# Patient Record
Sex: MALE | Race: WHITE | NOT HISPANIC OR LATINO | Employment: FULL TIME | URBAN - METROPOLITAN AREA
[De-identification: names, ages, dates, MRNs, and addresses within clinical notes are randomized per-mention and may not be internally consistent; named-entity substitution may affect disease eponyms.]

---

## 2020-10-31 ENCOUNTER — HOSPITAL ENCOUNTER (EMERGENCY)
Facility: HOSPITAL | Age: 50
Discharge: HOME | End: 2020-11-01
Attending: EMERGENCY MEDICINE | Admitting: EMERGENCY MEDICINE

## 2020-10-31 ENCOUNTER — APPOINTMENT (OUTPATIENT)
Dept: GENERAL RADIOLOGY | Facility: HOSPITAL | Age: 50
End: 2020-10-31

## 2020-10-31 VITALS
DIASTOLIC BLOOD PRESSURE: 92 MMHG | HEART RATE: 95 BPM | HEIGHT: 69 IN | OXYGEN SATURATION: 95 % | SYSTOLIC BLOOD PRESSURE: 143 MMHG | TEMPERATURE: 99.1 F | RESPIRATION RATE: 18 BRPM

## 2020-10-31 DIAGNOSIS — S92.332A CLOSED DISPLACED FRACTURE OF THIRD METATARSAL BONE OF LEFT FOOT, INITIAL ENCOUNTER: ICD-10-CM

## 2020-10-31 DIAGNOSIS — S92.325A CLOSED NONDISPLACED FRACTURE OF SECOND METATARSAL BONE OF LEFT FOOT, INITIAL ENCOUNTER: Primary | ICD-10-CM

## 2020-10-31 PROCEDURE — 99283 EMERGENCY DEPT VISIT LOW MDM: CPT

## 2020-10-31 PROCEDURE — 73630 X-RAY EXAM OF FOOT: CPT

## 2020-11-01 PROCEDURE — 25010000002 TDAP 5-2.5-18.5 LF-MCG/0.5 SUSPENSION: Performed by: NURSE PRACTITIONER

## 2020-11-01 PROCEDURE — 96374 THER/PROPH/DIAG INJ IV PUSH: CPT

## 2020-11-01 PROCEDURE — 90471 IMMUNIZATION ADMIN: CPT | Performed by: NURSE PRACTITIONER

## 2020-11-01 PROCEDURE — 90715 TDAP VACCINE 7 YRS/> IM: CPT | Performed by: NURSE PRACTITIONER

## 2020-11-01 PROCEDURE — 25010000002 MORPHINE PER 10 MG: Performed by: NURSE PRACTITIONER

## 2020-11-01 RX ORDER — SODIUM CHLORIDE 0.9 % (FLUSH) 0.9 %
10 SYRINGE (ML) INJECTION AS NEEDED
Status: DISCONTINUED | OUTPATIENT
Start: 2020-11-01 | End: 2020-11-01 | Stop reason: HOSPADM

## 2020-11-01 RX ORDER — HYDROCODONE BITARTRATE AND ACETAMINOPHEN 7.5; 325 MG/1; MG/1
1 TABLET ORAL EVERY 4 HOURS PRN
Qty: 16 TABLET | Refills: 0 | Status: SHIPPED | OUTPATIENT
Start: 2020-11-01 | End: 2020-12-03

## 2020-11-01 RX ORDER — MORPHINE SULFATE 2 MG/ML
4 INJECTION, SOLUTION INTRAMUSCULAR; INTRAVENOUS ONCE
Status: COMPLETED | OUTPATIENT
Start: 2020-11-01 | End: 2020-11-01

## 2020-11-01 RX ORDER — ONDANSETRON 2 MG/ML
4 INJECTION INTRAMUSCULAR; INTRAVENOUS ONCE
Status: DISCONTINUED | OUTPATIENT
Start: 2020-11-01 | End: 2020-11-01 | Stop reason: HOSPADM

## 2020-11-01 RX ADMIN — MORPHINE SULFATE 4 MG: 2 INJECTION, SOLUTION INTRAMUSCULAR; INTRAVENOUS at 00:25

## 2020-11-01 RX ADMIN — TETANUS TOXOID, REDUCED DIPHTHERIA TOXOID AND ACELLULAR PERTUSSIS VACCINE, ADSORBED 0.5 ML: 5; 2.5; 8; 8; 2.5 SUSPENSION INTRAMUSCULAR at 01:22

## 2020-11-01 NOTE — ED PROVIDER NOTES
The OSMAR and I have discussed this patient's history, physical exam, and treatment plan. I have reviewed the documentation and personally had a face to face interaction with the patient  I affirm the documentation and agree with the treatment and plan.  The following describes my personal findings.    The patient presents complaining of left foot pain post boat trailer falling on it    Limited physical exam:  Patient is nontoxic appearing in mild discomfort  Back/extremities: L foot TTpalp over forfoot with abrasion over dorsum, swelling, distal sens/cap refill intact    Pt voices understanding of the absolute need to elevate/ice leg when sitting, follow up with podiatry or orthopedic specialist for further testing/treatment as needed                                                                                                                                                                                                                                            Patient was wearing facemask when I entered the room and throughout our encounter. Full protective equipment was worn throughout this patient encounter including a face mask, eye protection and gloves. Hand hygiene was performed before donning protective equipment and after removal when leaving the room.           Lakesha Ledezma MD  11/01/20 0601

## 2020-11-01 NOTE — ED PROVIDER NOTES
EMERGENCY DEPARTMENT ENCOUNTER    Room Number:  01/01  Date of encounter:  11/1/2020  PCP: Lulu Helton MD  Historian: Patientv    PPE    Patient was placed in face mask in first look. Patient was wearing facemask when I entered the room and throughout our encounter. I wore full protective equipment throughout this patient encounter including a face mask, and gloves. Hand hygiene was performed before donning protective equipment and after removal when leaving the room.        HPI:  Chief Complaint: Left foot injury  A complete HPI/ROS/PMH/PSH/SH/FH are unobtainable due to: Nothing    Context: Didier Solano is a 50 y.o. male who arrives to the ED via private vehicle from home with his wife.  Patient presents with c/o moderate, constant, throbbing left foot pain status post injury prior to arrival.  Patient states that he was hooking up the boat trailer when one of the tires popped.  He states the trailer fell onto his foot.  He states he is not sure how he got his foot out from underneath the trailer but he was able to.   Patient denies any other injuries, fever, chills, chest pain, shortness of breath.  Patient states that nothing makes the symptoms better and any movement worsens symptoms.  Patient is unsure when his last tetanus injection was.  He does have a past medical history of GERD.        PAST MEDICAL HISTORY  Active Ambulatory Problems     Diagnosis Date Noted   • No Active Ambulatory Problems     Resolved Ambulatory Problems     Diagnosis Date Noted   • No Resolved Ambulatory Problems     No Additional Past Medical History         PAST SURGICAL HISTORY  No past surgical history on file.      FAMILY HISTORY  No family history on file.      SOCIAL HISTORY  Social History     Socioeconomic History   • Marital status:      Spouse name: Not on file   • Number of children: Not on file   • Years of education: Not on file   • Highest education level: Not on file          ALLERGIES  Penicillins        REVIEW OF SYSTEMS  Review of Systems     All systems reviewed and negative except for those discussed in HPI.        PHYSICAL EXAM    ED Triage Vitals   Temp Heart Rate Resp BP SpO2   10/31/20 2300 10/31/20 2300 10/31/20 2300 10/31/20 2323 10/31/20 2300   99.1 °F (37.3 °C) 95 18 143/92 99 %       Physical Exam   Musculoskeletal:        Feet:      GENERAL: Well appearing, non-toxic appearing, mildly distressed secondary to pain  HENT: normocephalic, atraumatic  EYES: no scleral icterus, PERRL  CV: regular rhythm, regular rate, no murmur  RESPIRATORY: normal effort, CTAB  ABDOMEN: soft  MUSCULOSKELETAL: no deformity  Tenderness and swelling noted to the dorsal aspect of the left foot, tenderness noted to the second and third metatarsal area  Dopplerable DP and PT pulse on the left  Normal sensation  NEURO: alert, moves all extremities, follows commands, mental status normal/baseline  SKIN: warm, dry, no rash   Psych: Appropriate mood and affect  Nursing notes and vital signs reviewed        RADIOLOGY  Xr Foot 3+ View Left    Result Date: 11/1/2020  3 VIEWS LEFT FOOT  HISTORY: Pain after dropping a bilobed  COMPARISON: None available.  FINDINGS: The patient has fractures involving the midshaft of the second and third metatarsals. The fracture of the second metatarsal is essentially nondisplaced. The fracture of the third metatarsal is mildly displaced laterally. No additional fractures are seen. There is some soft tissue swelling seen about the metatarsals.      Fractures of the midshaft of the second and third metatarsals.  This report was finalized on 11/1/2020 12:08 AM by Dr. Angie aWllace M.D.        I ordered the above noted radiological studies and viewed the images on the PACS system.         MEDICAL RECORD REVIEW  No medical records to review in epic      PROCEDURES    Procedures        DIFFERENTIAL DIAGNOSIS  /Differential Diagnosis for Lower Extremity Injury/trauma  include but are not limited to the following:    Fracture/sprain of the femur, tibia, fibula, ankle, foot or digits      PROGRESS, DATA ANALYSIS, CONSULTS, AND MEDICAL DECISION MAKING        ED Course as of Nov 01 0321   Sun Nov 01, 2020   0015 Discussed pertinent information from history and physical exam with patient.  Discussed differential diagnosis and plan for ED evaluation/work-up and treatment including x-ray of the left foot to rule out fracture and update tetanus injection.  All questions answered.  Patient is agreeable with this plan.        [MS]   0016 I viewed the patient's left foot imaging in PACS.  My interpretation is fractures of the second and third metatarsal.  See dictation for official radiology interpretation.        [MS]   0120 Reviewed pt's history and workup with Dr. Ledezma.  After a bedside evaluation, they agree with the plan of care.          [MS]   0320 Patient updated on x-ray results and need for close follow-up with foot surgeon.  Patient was instructed to wear the boot and use crutches.  He was provided with follow-up numbers for Dr. Cage and orthopedic of his choice.  Patient was given strict return to ER precautions including change in color or temperature of his left foot, decreased sensation, increased pain or swelling.  Patient and wife both verbalized understanding and are agreeable to this plan.    [MS]      ED Course User Index  [MS] Phyllis James APRN     Discussed plan for discharge, as there is no emergent indication for admission. Pt/family is agreeable and understands need for follow up and repeat testing.  Pt is aware that discharge does not mean that nothing is wrong but it indicates no emergency is present that requires admission and they must continue care with follow-up as given below or physician of their choice.   Patient/Family voiced understanding of above instructions.  Patient discharged in stable condition.    DIAGNOSIS  Final diagnoses:   Closed  nondisplaced fracture of second metatarsal bone of left foot, initial encounter   Closed displaced fracture of third metatarsal bone of left foot, initial encounter       FOLLOW UP   Tariq Cage, DPM  [address]  [facility] [address]  [phone]    In 1 day      Lulu Helton MD  [address] [facility] [address]  [address]  [facility] [address]  [phone]    In 1 day        RX     Medication List      New Prescriptions    HYDROcodone-acetaminophen 7.5-325 MG per tablet  Commonly known as: NORCO  Take 1 tablet by mouth Every 4 (Four) Hours As Needed for Moderate Pain .           Where to Get Your Medications      These medications were sent to [id number] - [place], [place] - [address] AT [facility] - [phone] [facility] [phone] FX [address], [facility] [address]    Hours: 24-hours Phone: [phone]   · HYDROcodone-acetaminophen 7.5-325 MG per tablet         Sean report [id number] reviewed.  Risks, benefits, alternatives discussed with patient.  Pt consents to treatment and agrees to follow up with PMD tomorrow for further care and any other prescriptions.           MEDICATIONS GIVEN IN ED    Medications   sodium chloride 0.9 % flush 10 mL (has no administration in time range)   ondansetron (ZOFRAN) injection 4 mg (4 mg Intravenous Not Given 11/1/20 0137)   morphine injection 4 mg (4 mg Intravenous Given 11/1/20 0025)   Tdap (BOOSTRIX) injection 0.5 mL (0.5 mL Intramuscular Given 11/1/20 0122)           COURSE & MEDICAL DECISION MAKING  Any/All labs and Any/All Imaging studies that were ordered were reviewed and are noted above.  Results were reviewed/discussed with the patient and they were also made aware of online assess.   Pt also made aware that some labs, such as cultures, will not be resulted during ER visit and follow up with PMD is necessary.        Phyllis James, APRN  11/01/20 0320

## 2020-11-01 NOTE — ED TRIAGE NOTES
Pt from home for L foot injury. States he had a boat trailer land on it about 30 mins ago. No deformity noted but foot appears bruised  Denies being able to stand on it. Reports pain is radiating to knee and feels nauseous    Pt wearing mask, RN wearing mask and goggles at this time.

## 2020-11-01 NOTE — DISCHARGE INSTRUCTIONS
Medications as ordered  Home to rest  Ice to painful areas for 20 minutes at a time, 4 times a day  Elevated to help reduce swelling  Tylenol or Motrin as needed for mild-moderate pain-take as instructed on package  Follow up with Dr Cage or orthopedic of your choice in 1-2 days, call Monday to schedule an appointment  Return to er for any worsening or new concerns including increased pain or swelling, change in color or temperature of left foot, decreased sensation to left foot

## 2020-11-02 ENCOUNTER — OFFICE VISIT (OUTPATIENT)
Dept: ORTHOPEDIC SURGERY | Facility: CLINIC | Age: 50
End: 2020-11-02

## 2020-11-02 VITALS — BODY MASS INDEX: 25.92 KG/M2 | TEMPERATURE: 98 F | HEIGHT: 69 IN | WEIGHT: 175 LBS

## 2020-11-02 DIAGNOSIS — S92.332A CLOSED DISPLACED FRACTURE OF THIRD METATARSAL BONE OF LEFT FOOT, INITIAL ENCOUNTER: Primary | ICD-10-CM

## 2020-11-02 DIAGNOSIS — S90.819A ABRASION, FOOT W/O INFECTION: ICD-10-CM

## 2020-11-02 DIAGNOSIS — S92.325A CLOSED NONDISPLACED FRACTURE OF SECOND METATARSAL BONE OF LEFT FOOT, INITIAL ENCOUNTER: ICD-10-CM

## 2020-11-02 PROCEDURE — 99204 OFFICE O/P NEW MOD 45 MIN: CPT | Performed by: ORTHOPAEDIC SURGERY

## 2020-11-02 RX ORDER — CLINDAMYCIN HYDROCHLORIDE 150 MG/1
150 CAPSULE ORAL 3 TIMES DAILY
Qty: 30 CAPSULE | Refills: 0 | Status: SHIPPED | OUTPATIENT
Start: 2020-11-02 | End: 2020-11-12

## 2020-11-02 RX ORDER — OMEPRAZOLE 10 MG/1
CAPSULE, DELAYED RELEASE ORAL
COMMUNITY
Start: 2020-06-12

## 2020-11-02 NOTE — PROGRESS NOTES
New Patient Complaint      Patient: Didier Solano  YOB: 1970 50 y.o. male  Medical Record Number: [id number]    Chief Complaints: I hurt my foot    History of Present Illness:   Patient injured his left foot on 10/31/2020 when the boat trailer collapsed landing on the top of his foot he had shoes on and did not puncture his shoe.  He was seen at the emergency room and placed into a boot and crutches and has been nonweightbearing.  He reports moderate intermittent stabbing pain worse with standing improved with ice and rest and with associated bruising and swelling.       HPI    Allergies:   Allergies   Allergen Reactions   • Penicillins Unknown - Low Severity       Medications:   Current Outpatient Medications on File Prior to Visit   Medication Sig   • HYDROcodone-acetaminophen (NORCO) 7.5-325 MG per tablet Take 1 tablet by mouth Every 4 (Four) Hours As Needed for Moderate Pain .   • omeprazole (prilOSEC) 10 MG capsule TAKE 1 TO 2 CAPSULES BY MOUTH DAILY     No current facility-administered medications on file prior to visit.        History reviewed. No pertinent past medical history.  Past Surgical History:   Procedure Laterality Date   • HAND SURGERY       Social History     Occupational History   • Not on file   Tobacco Use   • Smoking status: Never Smoker   • Smokeless tobacco: Never Used   Substance and Sexual Activity   • Alcohol use: Yes   • Drug use: Never   • Sexual activity: Defer      Social History     Social History Narrative   • Not on file     Family History   Problem Relation Age of Onset   • No Known Problems Mother    • No Known Problems Father    • No Known Problems Sister    • No Known Problems Brother    • No Known Problems Maternal Aunt    • No Known Problems Maternal Uncle    • No Known Problems Paternal Aunt    • No Known Problems Paternal Uncle    • No Known Problems Maternal Grandmother    • No Known Problems Maternal Grandfather    • No Known Problems Paternal Grandmother   "  • No Known Problems Paternal Grandfather    • No Known Problems Other    • Anesthesia problems Neg Hx    • Broken bones Neg Hx    • Cancer Neg Hx    • Clotting disorder Neg Hx    • Collagen disease Neg Hx    • Diabetes Neg Hx    • Dislocations Neg Hx    • Osteoporosis Neg Hx    • Rheumatologic disease Neg Hx    • Scoliosis Neg Hx    • Severe sprains Neg Hx        Review of Systems: 14 point review of systems performed, positive pertinent findings identified in HPI. All remaining systems negative     Review of Systems      Physical Exam:   Vitals:    11/02/20 1604   Temp: 98 °F (36.7 °C)   Weight: 79.4 kg (175 lb)   Height: 175.3 cm (69\")   PainSc:   9     Physical Exam   Constitutional: pleasant, well developed He is with his wife  Eyes: sclera non icteric  Hearing : adequate for exam  Cardiovascular: palpable pulses in left foot, left calf/ thigh NT without sign of DVT  Respiratoy: breathing unlabored   Neurological: grossly sensate to LT throughout left LE  Psychiatric: oriented with normal mood and affect.   Lymphatic: No palpable popliteal lymphadenopathy left LE  Skin: intact throughout left leg/foot except for abrasion over the dorsum of the left foot with no surrounding induration or drainage.  Musculoskeletal: Left foot shows moderate swelling but no sign of compartment syndrome.  He had somewhat limited motion to the toes but was able to flex and extend them to some degree and moderate discomfort of the dorsum of the left forefoot.  Physical Exam left  Ortho Exam    Radiology: 3 views of the left foot reviewed on the [facility] system from 10/31/2020 which show minimally displaced fracture of the third metatarsal and essentially nondisplaced fracture of the second toe appreciate significant angulation or translation of either fracture of the may be some plantar displacement of the third.    Assessment/Plan: Left second and third metatarsal fracture.    Reviewed with him that given the current alignment I " would not necessarily recommend surgical treatment but we need to get a CT scan to get a better idea of the three-dimensional alignment to make that definitive call.    Reviewed local wound care with him and we will go ahead and place him on clindamycin 150 mg p.o. every 8 hours for the next 10 days although I do not feel this represents an open fracture there is certainly a wound over the area.    We will continue with elevation and nonweightbearing.    Counseled him against use of oral anti-inflammatory such as Aleve Advil Naprosyn Motrin etc. but may use Tylenol so as to minimize chance of bone healing inhibition.    We will get a CT scan later this week or early next week and see him back in about 10 days with x-rays of his left foot and determine definitive treatment course going forward.

## 2020-11-03 NOTE — PROGRESS NOTES
I GOT HIM SCHEDULED FOR Monday 11/9TH AT [facility].  LEFT MESSAGE ON HIS VOICE MAIL WITH INFORMATION REGARDING CT SCAN.

## 2020-11-09 ENCOUNTER — HOSPITAL ENCOUNTER (OUTPATIENT)
Dept: CT IMAGING | Facility: HOSPITAL | Age: 50
Discharge: HOME | End: 2020-11-09
Admitting: ORTHOPAEDIC SURGERY

## 2020-11-09 DIAGNOSIS — S92.332A CLOSED DISPLACED FRACTURE OF THIRD METATARSAL BONE OF LEFT FOOT, INITIAL ENCOUNTER: ICD-10-CM

## 2020-11-09 DIAGNOSIS — S92.325A CLOSED NONDISPLACED FRACTURE OF SECOND METATARSAL BONE OF LEFT FOOT, INITIAL ENCOUNTER: ICD-10-CM

## 2020-11-09 PROCEDURE — 73700 CT LOWER EXTREMITY W/O DYE: CPT

## 2020-11-09 PROCEDURE — 76377 3D RENDER W/INTRP POSTPROCES: CPT

## 2020-11-11 ENCOUNTER — OFFICE VISIT (OUTPATIENT)
Dept: ORTHOPEDIC SURGERY | Facility: CLINIC | Age: 50
End: 2020-11-11

## 2020-11-11 VITALS — HEIGHT: 69 IN | WEIGHT: 175 LBS | BODY MASS INDEX: 25.92 KG/M2

## 2020-11-11 DIAGNOSIS — S92.332D CLOSED DISPLACED FRACTURE OF THIRD METATARSAL BONE OF LEFT FOOT WITH ROUTINE HEALING, SUBSEQUENT ENCOUNTER: ICD-10-CM

## 2020-11-11 DIAGNOSIS — S92.315A CLOSED NONDISPLACED FRACTURE OF FIRST METATARSAL BONE OF LEFT FOOT, INITIAL ENCOUNTER: Primary | ICD-10-CM

## 2020-11-11 DIAGNOSIS — S92.325D CLOSED NONDISPLACED FRACTURE OF SECOND METATARSAL BONE OF LEFT FOOT WITH ROUTINE HEALING, SUBSEQUENT ENCOUNTER: ICD-10-CM

## 2020-11-11 DIAGNOSIS — S90.819A ABRASION, FOOT W/O INFECTION: ICD-10-CM

## 2020-11-11 PROCEDURE — 99213 OFFICE O/P EST LOW 20 MIN: CPT | Performed by: ORTHOPAEDIC SURGERY

## 2020-11-11 NOTE — PROGRESS NOTES
"Foot Follow Up      Patient: Didier Solano    YOB: 1970 50 y.o. male    Chief Complaints: Foot getting better    History of Present Illness: Patient was seen initially on 11/2/2020 after injuring his left foot on 10/31/2020 when a boat trailer landed on it.  He was found to have at that time second and third metatarsal fractures and abrasion to the top of his foot.    He has been nonweightbearing and elevating and was sent for CT scan    States his pain and mobility have improved still with some swelling to the foot especially when hangs out when he is typing has been nonweightbearing using a scooter.  His abrasion is healing he has had no drainage or sign of infection to that area and does have some diminished sensation in the top of the foot.  Only gets a slight aching pain  HPI    ROS: Foot pain, no fevers chills chest pain or shortness of breath  Past Medical History:   Diagnosis Date   • Closed displaced fracture of third metatarsal bone of left foot 11/2/2020     Physical Exam:   Vitals:    11/11/20 0833   Weight: 79.4 kg (175 lb)   Height: 175.3 cm (69\")   PainSc: 0-No pain     Well developed with normal mood.  Left foot shows mild to moderate swelling.  The abrasion dorsum of the foot measures about 5 mm x 5 mm with area of eschar but no surrounding erythema does not appear to be full-thickness.  He was able to flex and extend his toes to some degree with more discomfort coming up and down.  There was some diminished sensation in the dorsum of the midfoot but grossly intact light touch.    Mild discomfort in the dorsum of the central forefoot and proximal medial midfoot but no gross deformity.  He was nontender on the first MTP joint    Radiology: CT scan films and report of the left foot dated 11/9/2020 were reviewed which shows a comminuted but nondisplaced intra-articular fracture on the dorsal lateral aspect of the first metatarsal base with the remainder the first metatarsal remaining " intact.    Transverse nondisplaced second metatarsal midshaft fracture and also transverse fracture of the distal third metatarsal shaft with 2 mm of plantar lateral displacement without appreciable angular deformity.    There was no abnormal widening of the space between the second metatarsal base and the first cuneiform    There is also chronic ossification of the lateral flexor tendon insertion to the base of the great toe proximal phalanx sesamoids are intact but lateral sesamoid is slightly proximally positioned felt to be result of an old collateral plantar plate injury.    In retrospect these were evident on his previous x-rays      Assessment/Plan: 1.  Left proximal 1st metatarsal dorsal lateral comminuted nondisplaced intra-articular fracture  2.  Left second metatarsal shaft nondisplaced fracture  3.  Left third metatarsal distal shaft fracture with 2 mm of plantar lateral displacement without significant angular deformity  4.  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    I do not see any sign of infection to the abrasion and he will continue with wound care to this area.    We will continue with elevation and nonweightbearing and use some compression socks that he has for daytime use to help with swelling and instructed him on passive range of motion exercises for the toes.    Anything worsens let me know otherwise I will see him back in 3 weeks x-rays of his left foot.    He was  provided with a temporary handicap parking application today.

## 2020-11-16 ENCOUNTER — HOSPITAL ENCOUNTER (OUTPATIENT)
Dept: CARDIOLOGY | Facility: HOSPITAL | Age: 50
Discharge: HOME | End: 2020-11-16
Admitting: ORTHOPAEDIC SURGERY

## 2020-11-16 ENCOUNTER — TELEPHONE (OUTPATIENT)
Dept: ORTHOPEDIC SURGERY | Facility: CLINIC | Age: 50
End: 2020-11-16

## 2020-11-16 DIAGNOSIS — M79.662 PAIN OF LEFT CALF: ICD-10-CM

## 2020-11-16 DIAGNOSIS — M79.662 PAIN OF LEFT CALF: Primary | ICD-10-CM

## 2020-11-16 LAB
BH CV LOW VAS LEFT SOLEAL VESSEL: 1
BH CV LOWER VASCULAR LEFT COMMON FEMORAL AUGMENT: NORMAL
BH CV LOWER VASCULAR LEFT COMMON FEMORAL COMPETENT: NORMAL
BH CV LOWER VASCULAR LEFT COMMON FEMORAL COMPRESS: NORMAL
BH CV LOWER VASCULAR LEFT COMMON FEMORAL PHASIC: NORMAL
BH CV LOWER VASCULAR LEFT COMMON FEMORAL SPONT: NORMAL
BH CV LOWER VASCULAR LEFT DISTAL FEMORAL COMPRESS: NORMAL
BH CV LOWER VASCULAR LEFT GASTRONEMIUS COMPRESS: NORMAL
BH CV LOWER VASCULAR LEFT GREATER SAPH AK COMPRESS: NORMAL
BH CV LOWER VASCULAR LEFT GREATER SAPH BK COMPRESS: NORMAL
BH CV LOWER VASCULAR LEFT LESSER SAPH COMPRESS: NORMAL
BH CV LOWER VASCULAR LEFT MID FEMORAL AUGMENT: NORMAL
BH CV LOWER VASCULAR LEFT MID FEMORAL COMPETENT: NORMAL
BH CV LOWER VASCULAR LEFT MID FEMORAL COMPRESS: NORMAL
BH CV LOWER VASCULAR LEFT MID FEMORAL PHASIC: NORMAL
BH CV LOWER VASCULAR LEFT MID FEMORAL SPONT: NORMAL
BH CV LOWER VASCULAR LEFT PERONEAL COMPRESS: NORMAL
BH CV LOWER VASCULAR LEFT POPLITEAL AUGMENT: NORMAL
BH CV LOWER VASCULAR LEFT POPLITEAL COMPETENT: NORMAL
BH CV LOWER VASCULAR LEFT POPLITEAL COMPRESS: NORMAL
BH CV LOWER VASCULAR LEFT POPLITEAL PHASIC: NORMAL
BH CV LOWER VASCULAR LEFT POPLITEAL SPONT: NORMAL
BH CV LOWER VASCULAR LEFT POSTERIOR TIBIAL COMPRESS: NORMAL
BH CV LOWER VASCULAR LEFT PROFUNDA FEMORAL COMPRESS: NORMAL
BH CV LOWER VASCULAR LEFT PROXIMAL FEMORAL COMPRESS: NORMAL
BH CV LOWER VASCULAR LEFT SAPHENOFEMORAL JUNCTION COMPRESS: NORMAL
BH CV LOWER VASCULAR LEFT SOLEAL COMPRESS: NORMAL
BH CV LOWER VASCULAR LEFT SOLEAL THROMBUS: NORMAL
BH CV LOWER VASCULAR RIGHT COMMON FEMORAL AUGMENT: NORMAL
BH CV LOWER VASCULAR RIGHT COMMON FEMORAL COMPETENT: NORMAL
BH CV LOWER VASCULAR RIGHT COMMON FEMORAL COMPRESS: NORMAL
BH CV LOWER VASCULAR RIGHT COMMON FEMORAL PHASIC: NORMAL
BH CV LOWER VASCULAR RIGHT COMMON FEMORAL SPONT: NORMAL

## 2020-11-16 PROCEDURE — 93971 EXTREMITY STUDY: CPT

## 2020-11-16 NOTE — PROGRESS NOTES
LLE Venous doppler study complete. Preliminary positive for acute calf DVT called to Dr. Armstrong. He will call patient   
Statement Selected

## 2020-11-16 NOTE — TELEPHONE ENCOUNTER
"Patient was last seen on 11/9/2020 states around 11/12/2020 start of increased pain swelling and tightness in his left calf and some of his leg into his hamstring.  He has been trying to do some stretching and using a \"rolling pin\" on it no chest pain or shortness of breath.    Reviewed him obviously there is concern for DVT.  We will work on arranging a Doppler for today and he understands he will need to be anticoagulated if there is such.  "

## 2020-11-16 NOTE — TELEPHONE ENCOUNTER
Patient is calling stating he has severe pain in his Lt calf and hamstring. He is having trouble sleeping and ice is not helping. Please advise.cld

## 2020-11-17 NOTE — TELEPHONE ENCOUNTER
I spoke with patient on the phone about his Doppler results and that he has a DVT in the soleal vessel explained in detail with him.  Reviewed the nature of this and that we need to treat him with anticoagulation and called in a prescription to his pharmacy for Eliquis 5 mg.  Instruction to take 2 tablets every 12 hours for the next 7 days and then 1 tablet every 12 hours and was given a 3-week supply and will need a total of 6 weeks of anticoagulation    Explained that at 6 weeks we will recheck a Doppler and if it is subacute or acute will need an additional 6 weeks of anticoagulation if it is chronic may then discontinued.    Counseled him about side effects of this which can include but not limited to urinary and GI bleeding counseled to look for any black or tarry stools and to be very careful as far as any cutting of himself with shaving etc. also if he develops any chest pain or shortness of breath go immediately to emergency room.    Also recommended that he come in to see Tono about getting some compression hose and to continue with limited weightbearing until follow-up.  He voiced clear understanding of all this and was encouraged to call for any questions.

## 2020-11-17 NOTE — TELEPHONE ENCOUNTER
He just saw Dr. Armstrong yesterday, but it sounds to me like there could be a concern for a blood clot.  Send him for Doppler exam

## 2020-12-03 ENCOUNTER — OFFICE VISIT (OUTPATIENT)
Dept: ORTHOPEDIC SURGERY | Facility: CLINIC | Age: 50
End: 2020-12-03

## 2020-12-03 VITALS — TEMPERATURE: 96 F | HEIGHT: 69 IN | WEIGHT: 175 LBS | BODY MASS INDEX: 25.92 KG/M2

## 2020-12-03 DIAGNOSIS — S92.332D CLOSED DISPLACED FRACTURE OF THIRD METATARSAL BONE OF LEFT FOOT WITH ROUTINE HEALING, SUBSEQUENT ENCOUNTER: ICD-10-CM

## 2020-12-03 DIAGNOSIS — S92.325D CLOSED NONDISPLACED FRACTURE OF SECOND METATARSAL BONE OF LEFT FOOT WITH ROUTINE HEALING, SUBSEQUENT ENCOUNTER: ICD-10-CM

## 2020-12-03 DIAGNOSIS — S90.819A ABRASION, FOOT W/O INFECTION: Primary | ICD-10-CM

## 2020-12-03 DIAGNOSIS — I82.4Z2 ACUTE DEEP VEIN THROMBOSIS (DVT) OF DISTAL VEIN OF LEFT LOWER EXTREMITY (HCC): ICD-10-CM

## 2020-12-03 DIAGNOSIS — S92.315D CLOSED NONDISPLACED FRACTURE OF FIRST METATARSAL BONE OF LEFT FOOT WITH ROUTINE HEALING, SUBSEQUENT ENCOUNTER: ICD-10-CM

## 2020-12-03 PROCEDURE — 99213 OFFICE O/P EST LOW 20 MIN: CPT | Performed by: ORTHOPAEDIC SURGERY

## 2020-12-03 PROCEDURE — 73630 X-RAY EXAM OF FOOT: CPT | Performed by: ORTHOPAEDIC SURGERY

## 2020-12-03 RX ORDER — APIXABAN 5 MG/1
TABLET, FILM COATED ORAL
COMMUNITY
Start: 2020-11-16 | End: 2021-01-07 | Stop reason: SDUPTHER

## 2020-12-03 NOTE — PROGRESS NOTES
"Foot Follow Up      Patient: Didier Solano    YOB: 1970 50 y.o. male    Chief Complaints: Foot feeling better    History of Present Illness:Patient was seen initially on 11/2/2020 after injuring his left foot on 10/31/2020 when a boat trailer landed on it.  He was found to have at that time second and third metatarsal fractures and abrasion to the top of his foot.    He was sent for CT scan and was last seen on 11/11/2020.  He was found to have additionally a fracture at the proximal aspect of the first metatarsal was intra-articular but nondisplaced as well as some chronic ossification around the left great toe lateral flexor tendon insertion at the base of the proximal phalanx likely from old plantar plate injury.    No surgical recommendations were made and he was instructed on elevation and use of compression socks that he already had and passive range of motion exercises.    He called on 11/16/2020 with complaints of tightness in his calf and was sent for Doppler.  Doppler from 1620 showed an acute DVT in the soleal vessel and he was started on Eliquis and was instructed on getting formal compression hose.    However he decided just use his compression socks as these have helped with his swelling and he has not had any calf pain since he started on the Eliquis and swelling has essentially resolved.    He reports that the pain in his foot has markedly improved with only a slight ache mainly along the third metatarsal.      HPI    ROS: Foot pain no fevers chills chest pain or shortness of breath  Past Medical History:   Diagnosis Date   • Closed displaced fracture of third metatarsal bone of left foot 11/2/2020     Physical Exam:   Vitals:    12/03/20 0845   Temp: 96 °F (35.6 °C)   Weight: 79.4 kg (175 lb)   Height: 175.3 cm (69\")   PainSc: 0-No pain     Well developed with normal mood.  Left foot abrasion is essentially healed with no sign of infection.  There is very slight discomfort on the third " metatarsal much more so than the second but no clinical deformity he was able to flex and extend his toes relatively well and left calf with it was without focal swelling or pain.      Radiology: 3 views of the left foot ordered evaluate fracture alignment reviewed and compared to prior x-rays on the [facility] and his previous CT scan.  Compared with prior x-rays is been some slight lateral shift of the distal portion of the third metatarsal but I do not think it is changed much from the CT scan and the second is without change in alignment there does not appear to be any appreciable sagittal plane deformity and does appear to be probably some early callus formation.  The base of the first metatarsal is without change in alignment and articular surface appears congruent.      Assessment/Plan:  1.  Left proximal 1st metatarsal dorsal lateral comminuted nondisplaced intra-articular fracture  2.  Left second metatarsal shaft nondisplaced fracture  3.  Left third metatarsal distal shaft fracture with 2 mm of plantar lateral displacement without significant angular deformity  4.  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  Left calf soleal DVT    We discussed treatment options and x-ray findings and nothing I would recommend from a surgical standpoint at this time.    We will continue with compression socks and Eliquis and will allow him to do about 50% partial foot flat weightbearing with his boot and crutches but understands not to rock up on his toes and to continue avoiding any anti-inflammatories.    If it is anything worsening to let me know otherwise I will see him back in about 3 weeks with x-rays of his left foot.

## 2020-12-23 ENCOUNTER — LAB (OUTPATIENT)
Dept: LAB | Facility: HOSPITAL | Age: 50
End: 2020-12-23

## 2020-12-23 ENCOUNTER — OFFICE VISIT (OUTPATIENT)
Dept: ORTHOPEDIC SURGERY | Facility: CLINIC | Age: 50
End: 2020-12-23

## 2020-12-23 VITALS — TEMPERATURE: 95.3 F | HEIGHT: 69 IN | BODY MASS INDEX: 26.66 KG/M2 | WEIGHT: 180 LBS

## 2020-12-23 DIAGNOSIS — E55.9 VITAMIN D DEFICIENCY: ICD-10-CM

## 2020-12-23 DIAGNOSIS — S92.315D CLOSED NONDISPLACED FRACTURE OF FIRST METATARSAL BONE OF LEFT FOOT WITH ROUTINE HEALING, SUBSEQUENT ENCOUNTER: ICD-10-CM

## 2020-12-23 DIAGNOSIS — S92.325D CLOSED NONDISPLACED FRACTURE OF SECOND METATARSAL BONE OF LEFT FOOT WITH ROUTINE HEALING, SUBSEQUENT ENCOUNTER: ICD-10-CM

## 2020-12-23 DIAGNOSIS — M79.672 LEFT FOOT PAIN: ICD-10-CM

## 2020-12-23 DIAGNOSIS — S90.819A ABRASION, FOOT W/O INFECTION: ICD-10-CM

## 2020-12-23 DIAGNOSIS — I82.4Z2 ACUTE DEEP VEIN THROMBOSIS (DVT) OF DISTAL VEIN OF LEFT LOWER EXTREMITY (HCC): Primary | ICD-10-CM

## 2020-12-23 LAB — 25(OH)D3 SERPL-MCNC: 30.3 NG/ML (ref 30–100)

## 2020-12-23 PROCEDURE — 73630 X-RAY EXAM OF FOOT: CPT | Performed by: ORTHOPAEDIC SURGERY

## 2020-12-23 PROCEDURE — 99213 OFFICE O/P EST LOW 20 MIN: CPT | Performed by: ORTHOPAEDIC SURGERY

## 2020-12-23 PROCEDURE — 36415 COLL VENOUS BLD VENIPUNCTURE: CPT

## 2020-12-23 PROCEDURE — 82306 VITAMIN D 25 HYDROXY: CPT

## 2020-12-23 NOTE — PROGRESS NOTES
"Foot Follow Up      Patient: Didier Solano    YOB: 1970 50 y.o. male    Chief Complaints: Foot \"pretty good\"    History of Present Illness: Patient follows up nonoperative treatment of left second and third metatarsal fractures that occurred on 10/31/2020 when a boat trailer landed on initially an abrasion on the top of his foot which healed.    During his treatment h had increased swelling and tightness in his calf and had a Doppler on 11/16/2020 which had shown soleal vessel DVTs and has been on Eliquis.     He was last seen on 12/3/2020 and has been using compression socks and has had no further complaints of pain or swelling in the calf.  He has been in his boot and using a scooter not putting much of any weight on his foot although he had been instructed on up to 50% partial weightbearing foot flat with his boot and crutches.    He has really minimal if any complaints of pain in the left foot      HPI    ROS: Foot pain no chest pain or shortness of breath  Past Medical History:   Diagnosis Date   • Closed displaced fracture of third metatarsal bone of left foot 11/2/2020     Physical Exam:   Vitals:    12/23/20 0915   Temp: 95.3 °F (35.2 °C)   TempSrc: Temporal   Weight: 81.6 kg (180 lb)   Height: 175.3 cm (69\")   PainSc: 0-No pain     Well developed with normal mood.  Left foot shows slight swelling unable to palpate any focal tenderness over the dorsum of the midfoot or forefoot today is able to flex extend his toes relatively well.  Left calf was soft and nontender.      Radiology: 3 views of the left foot ordered evaluate fracture alignment reviewed and compared to previous x-rays.  There is been no change in alignment to the there is been no change in alignment to the second and third metatarsal fractures with only slight angulation of the second.  There does appear to be some increased callus formation compared with previous x-rays and no appreciable change in sagittal alignment.  The " fracture at the base of the first metatarsal appears to be healing without displacement or appreciable step-off at the articular surface.    Vitamin D 12/23/2020 30.3      Assessment/Plan:  1.  Left proximal 1st metatarsal dorsal lateral comminuted nondisplaced intra-articular fracture  2.  Left second metatarsal shaft nondisplaced fracture  3.  Left third metatarsal distal shaft fracture with 2 mm of plantar lateral displacement without significant angular deformity  4.  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  Left calf soleal DVT    We reviewed treatment options C thing I would do from a surgical standpoint at this time although would like to see more healing thankfully no seen displacement.    He was sent over and had a vitamin D level drawn today which was only 30.3 and he was instructed to begin 5000 units of vitamin D daily and will check this again in about 4 weeks and will need to order this after I see him back again.    Regarding the fractures I reviewed treatment with him and would like anything from a surgical standpoint at this time I do not think we need to get a CT at this point.  We will allow him partial weightbearing with the boot and cane that was fitted today and he will continue with compression hose for the DVT and on Eliquis.    He needs to get a Doppler rechecked again in about a week understanding that if it is still acute or subacute he will need another 6 weeks of anticoagulation.    If he has any exacerbation of pain in his left foot he will back off and let me know otherwise I will see him back in 3 weeks with x-rays of his left foot.

## 2021-01-07 ENCOUNTER — TELEPHONE (OUTPATIENT)
Dept: ORTHOPEDIC SURGERY | Facility: CLINIC | Age: 51
End: 2021-01-07

## 2021-01-07 ENCOUNTER — HOSPITAL ENCOUNTER (OUTPATIENT)
Dept: CARDIOLOGY | Facility: HOSPITAL | Age: 51
Discharge: HOME | End: 2021-01-07
Admitting: ORTHOPAEDIC SURGERY

## 2021-01-07 ENCOUNTER — APPOINTMENT (OUTPATIENT)
Dept: CARDIOLOGY | Facility: HOSPITAL | Age: 51
End: 2021-01-07

## 2021-01-07 DIAGNOSIS — I82.4Z2 ACUTE DEEP VEIN THROMBOSIS (DVT) OF DISTAL VEIN OF LEFT LOWER EXTREMITY (HCC): ICD-10-CM

## 2021-01-07 PROCEDURE — 93971 EXTREMITY STUDY: CPT

## 2021-01-07 RX ORDER — APIXABAN 5 MG/1
5 TABLET, FILM COATED ORAL EVERY 12 HOURS SCHEDULED
Qty: 60 TABLET | Refills: 1 | Status: SHIPPED | OUTPATIENT
Start: 2021-01-07

## 2021-01-07 NOTE — TELEPHONE ENCOUNTER
----- Message from Jamil Armstrong MD sent at 1/7/2021 12:56 PM EST -----  Can you please call him about this and let them know that it appears to be subacute so we need to continue with Eliquis for another 6 weeks and make sure he has a prescription for this.  Please also make sure he has a follow-up appointment to see me in the next week or two.  Thank you

## 2021-01-07 NOTE — TELEPHONE ENCOUNTER
Have spoken with the patient regarding his Doppler report.  Of the repeat Doppler does show subacute thrombosis left calf.  Nathaniel is recommending that he continue with the Eliquis for an additional 6 weeks.  New prescription has been sent into [facility] at [phone] for Eliquis 5 mg, #60, directions are to take 1 every 12 hours and 1 refill per Dr. Armstrong.  Have also made him an appointment to see Dr. Armstrong in the office for follow-up on January 25

## 2021-01-25 ENCOUNTER — LAB (OUTPATIENT)
Dept: LAB | Facility: HOSPITAL | Age: 51
End: 2021-01-25

## 2021-01-25 ENCOUNTER — OFFICE VISIT (OUTPATIENT)
Dept: ORTHOPEDIC SURGERY | Facility: CLINIC | Age: 51
End: 2021-01-25

## 2021-01-25 VITALS — WEIGHT: 180 LBS | TEMPERATURE: 98 F | HEIGHT: 69 IN | BODY MASS INDEX: 26.66 KG/M2

## 2021-01-25 DIAGNOSIS — I82.462 DEEP VEIN THROMBOSIS (DVT) OF CALF MUSCLE VEIN OF LEFT LOWER EXTREMITY, UNSPECIFIED CHRONICITY (HCC): Primary | ICD-10-CM

## 2021-01-25 DIAGNOSIS — S92.332D CLOSED DISPLACED FRACTURE OF THIRD METATARSAL BONE OF LEFT FOOT WITH ROUTINE HEALING, SUBSEQUENT ENCOUNTER: ICD-10-CM

## 2021-01-25 DIAGNOSIS — R52 PAIN: ICD-10-CM

## 2021-01-25 DIAGNOSIS — S90.819A ABRASION, FOOT W/O INFECTION: ICD-10-CM

## 2021-01-25 DIAGNOSIS — S92.325D CLOSED NONDISPLACED FRACTURE OF SECOND METATARSAL BONE OF LEFT FOOT WITH ROUTINE HEALING, SUBSEQUENT ENCOUNTER: ICD-10-CM

## 2021-01-25 DIAGNOSIS — E55.9 VITAMIN D DEFICIENCY: ICD-10-CM

## 2021-01-25 DIAGNOSIS — S92.315D CLOSED NONDISPLACED FRACTURE OF FIRST METATARSAL BONE OF LEFT FOOT WITH ROUTINE HEALING, SUBSEQUENT ENCOUNTER: ICD-10-CM

## 2021-01-25 LAB — 25(OH)D3 SERPL-MCNC: 39.8 NG/ML (ref 30–100)

## 2021-01-25 PROCEDURE — 73630 X-RAY EXAM OF FOOT: CPT | Performed by: ORTHOPAEDIC SURGERY

## 2021-01-25 PROCEDURE — 82306 VITAMIN D 25 HYDROXY: CPT

## 2021-01-25 PROCEDURE — 99214 OFFICE O/P EST MOD 30 MIN: CPT | Performed by: ORTHOPAEDIC SURGERY

## 2021-01-25 PROCEDURE — 36415 COLL VENOUS BLD VENIPUNCTURE: CPT

## 2021-01-25 RX ORDER — ACYCLOVIR 400 MG/1
400 TABLET ORAL 3 TIMES DAILY
COMMUNITY
Start: 2021-01-16

## 2021-01-25 NOTE — PROGRESS NOTES
"Foot Follow Up      Patient: Didier Solano    YOB: 1970 50 y.o. male    Chief Complaints: Foot \"feels fine\"    History of Present Illness:Patient follows up nonoperative treatment of left second and third metatarsal fractures that occurred on 10/31/2020 when a boat trailer landed on initially an abrasion on the top of his foot which healed.     During his treatment he had increased swelling and tightness in his calf and had a Doppler on 11/16/2020 which had shown soleal vessel DVTs and has been on Eliquis.    He was last seen on 12/23/2020 have been using compression hose and no complaints of pain swelling in the calf and been doing by 50% weightbearing with his foot with minimal if any complaints of pain.    He was instructed on continued use of his boot and offloading it with a cane and continue compression hose as well as to continue Eliquis.  He was also sent for vitamin D level.    He seen back states that his foot is feeling fine he is mainly just been using his boot not really been using his cane much and occasionally gone without his boot in the house but being very careful about putting weight on the forefoot without significant complaints of pain in the foot       HPI    ROS: Foot pain no chest pain or shortness of breath  Past Medical History:   Diagnosis Date   • Closed displaced fracture of third metatarsal bone of left foot 11/2/2020     Physical Exam:   Vitals:    01/25/21 1613   Temp: 98 °F (36.7 °C)   Weight: 81.6 kg (180 lb)   Height: 175.3 cm (69\")   PainSc: 0-No pain     Well developed with normal mood.  Left foot showed really no focal discomfort over the second or third metatarsals to palpation was able to flex and extend his toes relatively well.  He was nontender in the first TMT joint.  Left calf was without focal pain or swelling.      Radiology: 3 views of the left foot ordered evaluate fracture alignment reviewed and compared to previous x-rays.  There is been no change in " alignment of the second and third metatarsal fractures which do appear to have increased callus formation around them of the fracture line is still somewhat evident.   the fracture line at the base of the first metatarsal is not clearly evident.  There remains ossification is chronic over the plantar lateral aspect of the first MTP joint.    Doppler report dated 1/7/2020 one of the left leg shows subacute DVT in the soleal vessels.    Vitamin D 12/23/2020 30.3    Assessment/Plan:  1.  Left proximal 1st metatarsal dorsal lateral comminuted nondisplaced intra-articular fracture  2.  Left second metatarsal shaft nondisplaced fracture  3.  Left third metatarsal distal shaft fracture with 2 mm of plantar lateral displacement without significant angular deformity  4.  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  Left calf soleal subacute DVT    We discussed treatment options although we can still see the fractures some on x-ray they do seem to be improving clinically and radiographically so not recommend further work-up as far CT scan or surgical treatment at this time.  In about 3 months I think we can start weaning him out of his boot and he is been doing some already to use a stiff sturdy accommodative shoe.  We will have him do this around the house for the next 10 to 14 days and if he does well may then start weaning out of it out of the house and use his cane if needed.    If he has any exacerbation of pain to back off and let me know.    Regarding his vitamin D he has been on 5000 units daily and needs to get his level rechecked and will do as such today..    Regarding his DVT he continues on Eliquis and will need to recheck his Doppler again in about 3 weeks as well.    We will see him back in 3 weeks with x-rays of his left foot.

## 2021-01-26 PROBLEM — E55.9 VITAMIN D DEFICIENCY: Status: ACTIVE | Noted: 2021-01-26

## 2021-01-26 PROBLEM — I82.462 DEEP VEIN THROMBOSIS (DVT) OF CALF MUSCLE VEIN OF LEFT LOWER EXTREMITY (HCC): Status: ACTIVE | Noted: 2020-12-03

## 2021-01-26 PROBLEM — S92.315A CLOSED NONDISPLACED FRACTURE OF FIRST LEFT METATARSAL BONE: Status: ACTIVE | Noted: 2021-01-26

## 2021-02-10 ENCOUNTER — HOSPITAL ENCOUNTER (OUTPATIENT)
Dept: CARDIOLOGY | Facility: HOSPITAL | Age: 51
Discharge: HOME | End: 2021-02-10
Admitting: ORTHOPAEDIC SURGERY

## 2021-02-10 DIAGNOSIS — I82.462 DEEP VEIN THROMBOSIS (DVT) OF CALF MUSCLE VEIN OF LEFT LOWER EXTREMITY, UNSPECIFIED CHRONICITY (HCC): ICD-10-CM

## 2021-02-10 PROCEDURE — 93971 EXTREMITY STUDY: CPT

## 2021-02-11 ENCOUNTER — TELEPHONE (OUTPATIENT)
Dept: ORTHOPEDIC SURGERY | Facility: CLINIC | Age: 51
End: 2021-02-11

## 2021-02-11 DIAGNOSIS — I82.462 DEEP VEIN THROMBOSIS (DVT) OF CALF MUSCLE VEIN OF LEFT LOWER EXTREMITY, UNSPECIFIED CHRONICITY (HCC): Primary | ICD-10-CM

## 2021-02-11 NOTE — TELEPHONE ENCOUNTER
----- Message from Jamil Armstrong MD sent at 2/11/2021  7:48 AM EST -----  Can you please call about results. DVT remains sub-acute. Needs to cont with Eliquis and needs appt with vascular for continued treatment of DVT thanks

## 2021-02-11 NOTE — TELEPHONE ENCOUNTER
Repeat Doppler at 12 weeks still shows the clot to be subacute.  Patient remains on Eliquis.  Dr. Armstrong is recommending that he see vascular surgery for further evaluation and determine how much longer the patient needs to remain on medication.  I have made him an appointment to see Dr. Fred Perez on Monday, February 15 at 10 AM.  I have left a message for the patient to contact me at the office regarding this appointment.  Information has been faxed to [phone]

## 2021-02-17 ENCOUNTER — OFFICE VISIT (OUTPATIENT)
Dept: ORTHOPEDIC SURGERY | Facility: CLINIC | Age: 51
End: 2021-02-17

## 2021-02-17 VITALS — TEMPERATURE: 98 F | BODY MASS INDEX: 26.66 KG/M2 | WEIGHT: 180 LBS | HEIGHT: 69 IN

## 2021-02-17 DIAGNOSIS — S92.325D CLOSED NONDISPLACED FRACTURE OF SECOND METATARSAL BONE OF LEFT FOOT WITH ROUTINE HEALING, SUBSEQUENT ENCOUNTER: ICD-10-CM

## 2021-02-17 DIAGNOSIS — S92.332D CLOSED DISPLACED FRACTURE OF THIRD METATARSAL BONE OF LEFT FOOT WITH ROUTINE HEALING, SUBSEQUENT ENCOUNTER: ICD-10-CM

## 2021-02-17 DIAGNOSIS — S92.315D CLOSED NONDISPLACED FRACTURE OF FIRST METATARSAL BONE OF LEFT FOOT WITH ROUTINE HEALING, SUBSEQUENT ENCOUNTER: ICD-10-CM

## 2021-02-17 DIAGNOSIS — R52 PAIN: Primary | ICD-10-CM

## 2021-02-17 DIAGNOSIS — I82.462 DEEP VEIN THROMBOSIS (DVT) OF CALF MUSCLE VEIN OF LEFT LOWER EXTREMITY, UNSPECIFIED CHRONICITY (HCC): ICD-10-CM

## 2021-02-17 DIAGNOSIS — E55.9 VITAMIN D DEFICIENCY: ICD-10-CM

## 2021-02-17 PROCEDURE — 73630 X-RAY EXAM OF FOOT: CPT | Performed by: ORTHOPAEDIC SURGERY

## 2021-02-17 PROCEDURE — 99214 OFFICE O/P EST MOD 30 MIN: CPT | Performed by: ORTHOPAEDIC SURGERY

## 2021-02-17 NOTE — PROGRESS NOTES
"Foot Follow Up      Patient: Didier Solano    YOB: 1970 50 y.o. male    Chief Complaints: Foot feeling better    History of Present Illness:Patient follows up nonoperative treatment of left second and third metatarsal fractures that occurred on 10/31/2020 when a boat trailer landed on initially an abrasion on the top of his foot which healed.     During his treatment he had increased swelling and tightness in his calf and had a Doppler on 11/16/2020 which had shown soleal vessel DVTs and has been on Eliquis.    He was last seen on 1/25/2021 and used his boot for another week or so and then start transitioning out to athletic shoes really without any significant complaints of pain.    He says that he is on it for prolonged period of time he gets a slight ache and cause him to limp but nothing that is really bothersome.    He was continued on Eliquis and had a subsequent Doppler performed that showed a continued subacute DVT.  He was sent to see vascular for this.    He has no complaints of pain in the calf or swelling to the foot.  HPI    ROS: No foot pain  Past Medical History:   Diagnosis Date   • Closed displaced fracture of third metatarsal bone of left foot 11/2/2020     Physical Exam:   Vitals:    02/17/21 0953   Temp: 98 °F (36.7 °C)   Weight: 81.6 kg (180 lb)   Height: 175.3 cm (69\")   PainSc:   2     Well developed with normal mood.  On exam he had really no focal tenderness along the midfoot and no clinical deformity he was able to flex and extend his toes well in the left foot.  Left calf was without focal swelling or pain.  He was nontender about the first TMT joint.      Radiology: 3 views left foot ordered evaluate fracture alignment reviewed and compared to previous x-rays.  There appears to be increased callus formation and density around the fracture sites compared with previous x-rays and no change in alignment.  The first metatarsal base fracture appears to be healed in good alignment " with persistent arthritic change at the first more so than second and third TMT joints.    Doppler report reviewed from 2/10/2021 which continues to show subacute left lower extremity DVT in the soleal vessel    Vitamin D 1/25/2021 39.8 up from 30.3 on 12/23/2020      Assessment/Plan: 1.  Left proximal 1st metatarsal dorsal lateral comminuted nondisplaced intra-articular fracture  2.  Left second metatarsal shaft nondisplaced fracture  3.  Left third metatarsal distal shaft fracture with 2 mm of plantar lateral displacement without significant angular deformity  4.  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  Left calf soleal subacute DVT     Regarding his DVT at our instruction after review of these results he has seen vascular and saw Dr. Perez earlier this week who recommended that he come off the Eliquis and just use daily aspirin.  If he does any traveling he may try getting back on the Eliquis while he is traveling and use compression hose.    We reviewed treatment options for his foot and clinically and radiographically it is improving I would not recommend surgical treatment or further work-up such as a CT scan.    She will continue with sturdy accommodative shoes and has been doing heel cord stretching twice a day recommend increase this to 3 or 4 times a day.    He is currently on vitamin D 5000 units daily and understands to have his level rechecked in about 3 weeks.    We will see him back about 6 weeks after he returns from month-long fishing trip to [place] that he is planning.

## 2021-03-26 ENCOUNTER — BULK ORDERING (OUTPATIENT)
Dept: CASE MANAGEMENT | Facility: OTHER | Age: 51
End: 2021-03-26

## 2021-03-26 DIAGNOSIS — Z23 IMMUNIZATION DUE: ICD-10-CM

## 2021-04-07 ENCOUNTER — OFFICE VISIT (OUTPATIENT)
Dept: ORTHOPEDIC SURGERY | Facility: CLINIC | Age: 51
End: 2021-04-07

## 2021-04-07 VITALS
BODY MASS INDEX: 26.77 KG/M2 | WEIGHT: 187 LBS | OXYGEN SATURATION: 98 % | RESPIRATION RATE: 20 BRPM | HEART RATE: 87 BPM | HEIGHT: 70 IN

## 2021-04-07 DIAGNOSIS — S92.315D CLOSED NONDISPLACED FRACTURE OF FIRST METATARSAL BONE OF LEFT FOOT WITH ROUTINE HEALING, SUBSEQUENT ENCOUNTER: ICD-10-CM

## 2021-04-07 DIAGNOSIS — S92.325D CLOSED NONDISPLACED FRACTURE OF SECOND METATARSAL BONE OF LEFT FOOT WITH ROUTINE HEALING, SUBSEQUENT ENCOUNTER: ICD-10-CM

## 2021-04-07 DIAGNOSIS — S92.332D CLOSED DISPLACED FRACTURE OF THIRD METATARSAL BONE OF LEFT FOOT WITH ROUTINE HEALING, SUBSEQUENT ENCOUNTER: ICD-10-CM

## 2021-04-07 DIAGNOSIS — E55.9 VITAMIN D DEFICIENCY: Primary | ICD-10-CM

## 2021-04-07 DIAGNOSIS — I82.462 DEEP VEIN THROMBOSIS (DVT) OF CALF MUSCLE VEIN OF LEFT LOWER EXTREMITY, UNSPECIFIED CHRONICITY (HCC): ICD-10-CM

## 2021-04-07 PROCEDURE — 73630 X-RAY EXAM OF FOOT: CPT | Performed by: ORTHOPAEDIC SURGERY

## 2021-04-07 PROCEDURE — 99213 OFFICE O/P EST LOW 20 MIN: CPT | Performed by: ORTHOPAEDIC SURGERY

## 2021-04-07 NOTE — PROGRESS NOTES
"Foot Follow Up      Patient: Didier Solano    YOB: 1970 50 y.o. male    Chief Complaints: Foot     History of Present Illness:Patient follows up nonoperative treatment of left second and third metatarsal fractures that occurred on 10/31/2020 when a boat trailer landed on initially an abrasion on the top of his foot which healed.     During his treatment he had increased swelling and tightness in his calf and had a Doppler on 11/16/2020 which had shown soleal vessel DVTs and has been on Eliquis.    He was last seen on 2/17/2021 at which time his foot was clinically and radiographically improving so no further work-up was recommended.    He was instructed to continue with accommodative shoes and increase heel cord stretching.    At that time he was on vitamin D 5000 units daily and was instructed to have his level rechecked 3 weeks thereafter but never had this done.    He is also seeing vascular prior to our last visit who recommended that he come off Eliquis and just use daily aspirin and possibly get back on Eliquis and use compression hose when he was traveling.    He has no complaints of pain in the left foot and has been quite active fishing.  He had no complaints of pain in the calf      HPI    ROS: No foot pain, no calf pain no fevers chills chest pain or shortness of breath  Past Medical History:   Diagnosis Date   • Closed displaced fracture of third metatarsal bone of left foot 11/2/2020     Physical Exam:   Vitals:    04/07/21 0811   Pulse: 87   Resp: 20   SpO2: 98%   Weight: 84.8 kg (187 lb)   Height: 176.5 cm (69.5\")   PainSc: 0-No pain     Well developed with normal mood.  Nonantalgic gait.  Left foot was completely nontender on the 2nd and 3rd metatarsals he was able to flex and extend his toes well and had no pain around the 1st or 2nd TMT joints to palpation or with tarsometatarsal manipulation.      Radiology: 3 views of the left foot ordered evaluate fracture alignment reviewed and " compared with previous x-rays.  The 2nd and 3rd metatarsal fractures appear to have healed there is good callus formation and no change in alignment this is improved since previous x-rays.  The fracture at the base of the 1st metatarsal is not evident and appeared healed there is no gross malalignment.      Doppler report reviewed from 2/10/2021 which continues to show subacute left lower extremity DVT in the soleal vessel    Vitamin D 1/25/2021 39.8 up from 30.3 on 12/23/2020    Assessment/Plan:  1.  Left proximal 1st metatarsal dorsal lateral comminuted nondisplaced intra-articular fracture  2.  Left second metatarsal shaft nondisplaced fracture  3.  Left third metatarsal distal shaft fracture with 2 mm of plantar lateral displacement without significant angular deformity  4.  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  Left calf soleal subacute DVT     He can continue with daily aspirin for DVT prophylaxis and use compression hose when he travels and will continue with heel cord stretching exercises use of accommodative shoes and advance activity slowly as tolerated.    If he has any recurrent problems with the foot he will let me know otherwise I will see him back as needed.    He is on vitamin D 5000 units daily but never had his level rechecked and is going to do that we can adjust dose from there and will need to follow-up with his PCP going forward subsequent to that for further dosing and monitoring.

## 2022-07-02 ENCOUNTER — HOSPITAL ENCOUNTER (EMERGENCY)
Facility: HOSPITAL | Age: 52
Discharge: HOME | End: 2022-07-02
Attending: EMERGENCY MEDICINE | Admitting: EMERGENCY MEDICINE

## 2022-07-02 ENCOUNTER — APPOINTMENT (OUTPATIENT)
Dept: GENERAL RADIOLOGY | Facility: HOSPITAL | Age: 52
End: 2022-07-02

## 2022-07-02 ENCOUNTER — APPOINTMENT (OUTPATIENT)
Dept: CT IMAGING | Facility: HOSPITAL | Age: 52
End: 2022-07-02

## 2022-07-02 VITALS
HEIGHT: 69 IN | BODY MASS INDEX: 28.14 KG/M2 | TEMPERATURE: 96.5 F | DIASTOLIC BLOOD PRESSURE: 99 MMHG | RESPIRATION RATE: 18 BRPM | OXYGEN SATURATION: 96 % | SYSTOLIC BLOOD PRESSURE: 141 MMHG | HEART RATE: 84 BPM | WEIGHT: 190 LBS
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
## 2022-07-02 DIAGNOSIS — S43.004A SHOULDER SEPARATION, RIGHT, INITIAL ENCOUNTER: Primary | ICD-10-CM

## 2022-07-02 DIAGNOSIS — R07.81 RIB PAIN ON RIGHT SIDE: ICD-10-CM

## 2022-07-02 PROCEDURE — 25010000002 MORPHINE PER 10 MG: Performed by: EMERGENCY MEDICINE

## 2022-07-02 PROCEDURE — 73030 X-RAY EXAM OF SHOULDER: CPT

## 2022-07-02 PROCEDURE — 96375 TX/PRO/DX INJ NEW DRUG ADDON: CPT

## 2022-07-02 PROCEDURE — 71101 X-RAY EXAM UNILAT RIBS/CHEST: CPT

## 2022-07-02 PROCEDURE — 25010000002 HYDROMORPHONE 1 MG/ML SOLUTION: Performed by: EMERGENCY MEDICINE

## 2022-07-02 PROCEDURE — 73200 CT UPPER EXTREMITY W/O DYE: CPT

## 2022-07-02 PROCEDURE — 96374 THER/PROPH/DIAG INJ IV PUSH: CPT

## 2022-07-02 PROCEDURE — 99283 EMERGENCY DEPT VISIT LOW MDM: CPT

## 2022-07-02 RX ORDER — MORPHINE SULFATE 2 MG/ML
4 INJECTION, SOLUTION INTRAMUSCULAR; INTRAVENOUS ONCE
Status: COMPLETED | OUTPATIENT
Start: 2022-07-02 | End: 2022-07-02

## 2022-07-02 RX ORDER — HYDROCODONE BITARTRATE AND ACETAMINOPHEN 5; 325 MG/1; MG/1
1 TABLET ORAL EVERY 6 HOURS PRN
Qty: 12 TABLET | Refills: 0 | Status: SHIPPED | OUTPATIENT
Start: 2022-07-02

## 2022-07-02 RX ADMIN — HYDROMORPHONE HYDROCHLORIDE 1 MG: 1 INJECTION, SOLUTION INTRAMUSCULAR; INTRAVENOUS; SUBCUTANEOUS at 10:58

## 2022-07-02 RX ADMIN — MORPHINE SULFATE 4 MG: 2 INJECTION, SOLUTION INTRAMUSCULAR; INTRAVENOUS at 09:27

## 2022-07-02 NOTE — DISCHARGE INSTRUCTIONS
Although you are being discharged from the ED today, I encourage you to return for worsening symptoms. Things can, and do, change such that treatment at home with medication may not be adequate. Specifically I recommend returning for chest pain or discomfort, difficulty breathing, persistent vomiting or difficulty holding down liquids or medications, fever > 102.0 F or any other worsening or alarming symptoms.     Rest. Drink plenty of fluids.  Follow up with Dr. Armstrong for further evaluation and management.  Follow up with primary care provider for further management and to have blood pressure rechecked.  Take your medications as prescribed.  Do not drive, work, operate heavy machinery, or otherwise engage in any risky behavior while on narcotic pain medications or other sedating drugs. Do not sign any legal paperwork within 24 hours of taking narcotic pain medications or other sedating drugs. These medications may impair judgement and/or motor capabilities.    Narcotic Medication Notice:    Today you are receiving a medication with properties that cause sedation and/or drowsiness. Please use caution when taking this medication to avoid situations where decreased concentration or sleepiness may affect your performance of the task at hand. This includes driving vehicles, operating machinery, or any other task where your personal safety or the safety of others can be a risk. I do not recommend continuing any these activities while this medication until you know how it can affect you.     Also please be aware that these medications have been known to be habit forming. This means that even when taken as prescribed they can be addictive. Please pay attention to how and when you take this medication and be sensitive to the fact you may become addicted. Take only as directed. Do not change the manner in which you take this medication until you discuss with a licensed physician. Should addiction occur I recommend  discussing with your primary care doctor or return to the emergency department.    Lastly, the emergency department typically does not refill pain medication prescriptions due to limitations set out in [place] state laws. Any necessary refills need to be discussed with your primary care provider and/or pain management provider.    It is a [place] state law that you take your narcotic medication as prescribed and do not take any excessive pills. It is illegal to obtain prescriptions for any narcotic medications from multiple providers at the same time.
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
## 2022-07-05 ENCOUNTER — TELEPHONE (OUTPATIENT)
Dept: ORTHOPEDIC SURGERY | Facility: CLINIC | Age: 52
End: 2022-07-05

## 2022-07-05 NOTE — TELEPHONE ENCOUNTER
Since I have been out of town I am double booked this week but he can see Jose Eduardo.  This will be treated nonoperatively based on his x-rays and CT

## 2022-07-05 NOTE — TELEPHONE ENCOUNTER
Provider: DR. PARI WELSH  Caller: VERA ROBERSON  Relationship to Patient: SELF  Phone Number: [phone]  Reason for Call: PATIENT SEEN AT [facility] ED 07/02/22, (DX: Evidence of right acromioclavicular separation injury. ED NOTES, X-RAY AND CT SCAN IN CHART 07/02/22) NO PRIOR SURGERIES.) STATES HE WAS ADVISED BY ED TO FOLLOW UP ASAP WITH DR. PARI WELSH. SENDING TE DUE TO NO AVAILABILITY AND DIAGNOSIS. ESTABLISHED PATIENT WITH DR. FITCH. REQUESTING A CALL BACK. PLEASE ADVISE.

## 2022-07-07 ENCOUNTER — OFFICE VISIT (OUTPATIENT)
Dept: ORTHOPEDIC SURGERY | Facility: CLINIC | Age: 52
End: 2022-07-07

## 2022-07-07 VITALS — BODY MASS INDEX: 28.16 KG/M2 | HEIGHT: 69 IN | TEMPERATURE: 97.3 F | WEIGHT: 190.1 LBS

## 2022-07-07 DIAGNOSIS — S42.034D CLOSED NONDISPLACED FRACTURE OF ACROMIAL END OF RIGHT CLAVICLE WITH ROUTINE HEALING, SUBSEQUENT ENCOUNTER: ICD-10-CM

## 2022-07-07 DIAGNOSIS — S43.101D: Primary | ICD-10-CM

## 2022-07-07 PROCEDURE — 99213 OFFICE O/P EST LOW 20 MIN: CPT | Performed by: NURSE PRACTITIONER
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
## 2022-07-22 ENCOUNTER — TELEPHONE (OUTPATIENT)
Dept: ORTHOPEDIC SURGERY | Facility: CLINIC | Age: 52
End: 2022-07-22

## 2022-07-22 NOTE — TELEPHONE ENCOUNTER
Caller: PATIENT    Relationship to patient: SELF    Best call back number: [phone]    Patient is needing: PATIENT WAS CALLING TO RESCHEDULE HIS APPT WITH MS. SRINATH BUTLER TODAY DUE TO RECENTLY GETTING BACK INTO THE [place]TRY AND FEELING ILL. PATIENT STATED HE TESTED NEGATIVE FOR COVID TODAY BUT WAS NOT COMFORTABLE COMING INTO THE OFFICE BECAUSE HE WAS ILL AND COULD STILL BE POSITIVE ITS JUST NOT SHOWING UP YET. PATIENT HAS A FEVER, DIARRHEA AND A FEW OTHER SYMPTOMS. I TRIED TO WARM TRANSFER CALL TO THE OFFICE FOR RESCHEDULING DUE TO PATIENT'S APPT BEING WITHIN 48 HOURS AND COVID RELATED BUT I RECEIVED NO ANSWER. I DID GO AHEAD AND CANCEL PATIENT'S APPT FOR TODAY BUT HE WOULD LIKE A CALL BACK TO RESCHEDULE. THANK YOU!
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
## 2022-07-28 ENCOUNTER — OFFICE VISIT (OUTPATIENT)
Dept: ORTHOPEDIC SURGERY | Facility: CLINIC | Age: 52
End: 2022-07-28

## 2022-07-28 VITALS — HEIGHT: 69 IN | BODY MASS INDEX: 28.16 KG/M2 | WEIGHT: 190.1 LBS | TEMPERATURE: 97.3 F

## 2022-07-28 DIAGNOSIS — S43.101D: ICD-10-CM

## 2022-07-28 DIAGNOSIS — S42.034D CLOSED NONDISPLACED FRACTURE OF ACROMIAL END OF RIGHT CLAVICLE WITH ROUTINE HEALING, SUBSEQUENT ENCOUNTER: Primary | ICD-10-CM

## 2022-07-28 PROCEDURE — 73030 X-RAY EXAM OF SHOULDER: CPT | Performed by: NURSE PRACTITIONER

## 2022-07-28 PROCEDURE — 99213 OFFICE O/P EST LOW 20 MIN: CPT | Performed by: NURSE PRACTITIONER

## 2023-09-08 NOTE — PROGRESS NOTES
New Elbow      Patient: Didier Solano        YOB: 1970        Chief Complaints: Elbow pain right      History of Present Illness:  This is a 53-year-old male is right-hand dominant presents with 6-month history of right elbow pain he states lately he is done much better and he is really done a lot of yard work he is not sure why it got better but it has his pain is primarily lateral he gets occasional pain anteriorly in the area of the distal biceps no 1 particular history injury change in activity        Allergies:   Allergies   Allergen Reactions    Penicillins Unknown - Low Severity       Medications:   Home Medications:  Current Outpatient Medications on File Prior to Visit   Medication Sig    acyclovir (ZOVIRAX) 400 MG tablet Take 400 mg by mouth 3 (Three) Times a Day.    Eliquis 5 MG tablet tablet Take 1 tablet by mouth Every 12 (Twelve) Hours.    HYDROcodone-acetaminophen (NORCO) 5-325 MG per tablet Take 1 tablet by mouth Every 6 (Six) Hours As Needed for Moderate Pain .    omeprazole (prilOSEC) 10 MG capsule TAKE 1 TO 2 CAPSULES BY MOUTH DAILY     No current facility-administered medications on file prior to visit.     Current Medications:  Scheduled Meds:  Continuous Infusions:No current facility-administered medications for this visit.    PRN Meds:.    Past Medical History:   Diagnosis Date    Closed displaced fracture of third metatarsal bone of left foot 11/02/2020    GERD (gastroesophageal reflux disease)         Past Surgical History:   Procedure Laterality Date    HAND SURGERY          Social History     Occupational History    Not on file   Tobacco Use    Smoking status: Never    Smokeless tobacco: Never   Vaping Use    Vaping Use: Never used   Substance and Sexual Activity    Alcohol use: Yes    Drug use: Never    Sexual activity: Defer      Social History     Social History Narrative    Not on file        Family History   Problem Relation Age of Onset    No Known Problems Mother      No Known Problems Father     No Known Problems Sister     No Known Problems Brother     No Known Problems Maternal Aunt     No Known Problems Maternal Uncle     No Known Problems Paternal Aunt     No Known Problems Paternal Uncle     No Known Problems Maternal Grandmother     No Known Problems Maternal Grandfather     No Known Problems Paternal Grandmother     No Known Problems Paternal Grandfather     No Known Problems Other     Anesthesia problems Neg Hx     Broken bones Neg Hx     Cancer Neg Hx     Clotting disorder Neg Hx     Collagen disease Neg Hx     Diabetes Neg Hx     Dislocations Neg Hx     Osteoporosis Neg Hx     Rheumatologic disease Neg Hx     Scoliosis Neg Hx     Severe sprains Neg Hx              Review of Systems:   Review of Systems      Physical Exam: 53 y.o. male  General Appearance:    Alert, cooperative, in no acute distress                 There were no vitals filed for this visit.   Patient is alert and read ×3 no acute distress appears her above-listed at height weight and age.  Affect is normal respiratory rate is normal unlabored. Heart rate regular rate rhythm, sclera, dentition and hearing are normal for the purpose of this exam.        Ortho Exam         Physical exam of the right elbow reveals no effusion no redness.  The patient has full range of motion.  They have tenderness over the lateral condyle.  There pain with resisted wrist extension no pain with passive wrist flexion.  They have a negative Tinel's.  Have no overlying skin changes no lymphedema no lymphadenopathy.  Good distal pulses he has no pain with resisted forearm flexion a little bit of pain with resisted forearm supination  Radiology:   AP, Lateral of the right elbow were ordered/reviewed to evaluate pain.  I have no comparative films these are normal he has a little bit of arthritis with some spiking of the coronoid process otherwise pretty normal      Assessment/Plan:  Right elbow pain I think this is lateral  epicondylitis which I discussed with him in detail.  I showed him how to stretch his extensors and his flexors we will give him a tennis elbow strap a cock-up splint I will give him some Rx alternative cream and have him start on some meloxicam with strict precautions.  Should he fail this or still have symptoms we would consider an injection I will see him back for 5 weeks                                Answers submitted by the patient for this visit:  Primary Reason for Visit (Submitted on 9/11/2023)  What is the primary reason for your visit?: Other  Other (Submitted on 9/11/2023)  Please describe your symptoms.: Pain in Right elbow and forearm.  Trouble with  strength and  and lifting  Have you had these symptoms before?: No  How long have you been having these symptoms?: Greater than 2 weeks  Please list any medications you are currently taking for this condition.: Omeprazole 40mg  Please describe any probable cause for these symptoms. : unknown

## 2023-09-12 ENCOUNTER — OFFICE VISIT (OUTPATIENT)
Dept: ORTHOPEDIC SURGERY | Facility: CLINIC | Age: 53
End: 2023-09-12
Payer: COMMERCIAL

## 2023-09-12 VITALS — BODY MASS INDEX: 29.21 KG/M2 | HEIGHT: 69 IN | TEMPERATURE: 98.2 F | WEIGHT: 197.2 LBS

## 2023-09-12 DIAGNOSIS — M77.11 LATERAL EPICONDYLITIS OF RIGHT ELBOW: ICD-10-CM

## 2023-09-12 DIAGNOSIS — R52 PAIN: Primary | ICD-10-CM

## 2023-09-12 RX ORDER — MELOXICAM 15 MG/1
TABLET ORAL
Qty: 30 TABLET | Refills: 0 | Status: SHIPPED | OUTPATIENT
Start: 2023-09-12